# Patient Record
Sex: FEMALE | Race: WHITE | NOT HISPANIC OR LATINO | Employment: UNEMPLOYED | URBAN - METROPOLITAN AREA
[De-identification: names, ages, dates, MRNs, and addresses within clinical notes are randomized per-mention and may not be internally consistent; named-entity substitution may affect disease eponyms.]

---

## 2024-01-29 DIAGNOSIS — F41.9 ANXIETY: Primary | ICD-10-CM

## 2024-01-30 RX ORDER — MUPIROCIN 20 MG/G
OINTMENT TOPICAL
COMMUNITY
Start: 2023-02-27

## 2024-01-30 RX ORDER — MULTIVIT-MIN/IRON/FOLIC ACID/K 18-600-40
CAPSULE ORAL
COMMUNITY

## 2024-01-30 RX ORDER — BIMATOPROST 0.1 MG/ML
1 SOLUTION/ DROPS OPHTHALMIC
COMMUNITY

## 2024-01-30 RX ORDER — TRAMADOL HYDROCHLORIDE 50 MG/1
50 TABLET ORAL 2 TIMES DAILY PRN
COMMUNITY

## 2024-01-30 RX ORDER — HYDROCHLOROTHIAZIDE 25 MG/1
1 TABLET ORAL DAILY
COMMUNITY
Start: 2015-09-09

## 2024-01-30 RX ORDER — ASPIRIN 81 MG/1
TABLET ORAL EVERY 24 HOURS
COMMUNITY

## 2024-01-30 RX ORDER — LANOLIN ALCOHOL/MO/W.PET/CERES
1 CREAM (GRAM) TOPICAL DAILY
COMMUNITY

## 2024-01-30 RX ORDER — ESTROGENS, CONJUGATED 0.9 MG/1
TABLET, FILM COATED ORAL EVERY 24 HOURS
COMMUNITY
Start: 2008-07-23

## 2024-01-30 RX ORDER — SERTRALINE HYDROCHLORIDE 100 MG/1
100 TABLET, FILM COATED ORAL DAILY
Qty: 90 TABLET | Refills: 2 | Status: SHIPPED | OUTPATIENT
Start: 2024-01-30

## 2024-01-30 RX ORDER — ACETAMINOPHEN 500 MG
2000 TABLET ORAL DAILY
COMMUNITY

## 2024-01-30 RX ORDER — SERTRALINE HYDROCHLORIDE 100 MG/1
100 TABLET, FILM COATED ORAL DAILY
COMMUNITY
End: 2024-01-30 | Stop reason: SDUPTHER

## 2024-01-30 RX ORDER — BRIMONIDINE TARTRATE AND TIMOLOL MALEATE 2; 5 MG/ML; MG/ML
SOLUTION OPHTHALMIC
COMMUNITY

## 2024-04-10 PROBLEM — Z96.651 STATUS POST TOTAL RIGHT KNEE REPLACEMENT: Status: ACTIVE | Noted: 2024-04-10

## 2024-04-10 PROBLEM — R60.9 EDEMA: Status: ACTIVE | Noted: 2024-04-10

## 2024-04-10 PROBLEM — R32 URINARY INCONTINENCE: Status: ACTIVE | Noted: 2024-04-10

## 2024-04-10 PROBLEM — L03.119 CELLULITIS OF LOWER LEG: Status: ACTIVE | Noted: 2024-04-10

## 2024-04-10 PROBLEM — M46.1 INFLAMMATION OF SACROILIAC JOINT (CMS-HCC): Status: RESOLVED | Noted: 2024-04-10 | Resolved: 2024-04-10

## 2024-04-10 PROBLEM — M25.559 HIP PAIN: Status: ACTIVE | Noted: 2024-04-10

## 2024-04-10 PROBLEM — R26.9 ABNORMAL GAIT: Status: ACTIVE | Noted: 2024-04-10

## 2024-04-10 PROBLEM — S39.012A BACK STRAIN: Status: RESOLVED | Noted: 2024-04-10 | Resolved: 2024-04-10

## 2024-04-10 PROBLEM — M19.90 OSTEOARTHRITIS: Status: ACTIVE | Noted: 2024-04-10

## 2024-04-10 PROBLEM — F32.A DEPRESSION: Status: ACTIVE | Noted: 2024-04-10
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
## 2024-04-10 PROBLEM — M48.062 LUMBAR STENOSIS WITH NEUROGENIC CLAUDICATION: Status: ACTIVE | Noted: 2024-04-10

## 2024-04-10 PROBLEM — M25.539 PAIN IN WRIST: Status: ACTIVE | Noted: 2024-04-10

## 2024-04-10 PROBLEM — Z91.89 AT RISK FOR BLEEDING: Status: ACTIVE | Noted: 2024-04-10

## 2024-04-10 PROBLEM — I87.2 PERIPHERAL VENOUS INSUFFICIENCY: Status: ACTIVE | Noted: 2024-04-10

## 2024-04-10 PROBLEM — D64.9 ANEMIA: Status: ACTIVE | Noted: 2024-04-10

## 2024-04-10 PROBLEM — M25.579 ANKLE PAIN: Status: ACTIVE | Noted: 2024-04-10

## 2024-04-10 PROBLEM — E78.00 HYPERCHOLESTEROLEMIA: Status: ACTIVE | Noted: 2024-04-10

## 2024-04-10 PROBLEM — N95.9 UNSPECIFIED MENOPAUSAL AND PERIMENOPAUSAL DISORDER: Status: ACTIVE | Noted: 2024-04-10

## 2024-04-10 PROBLEM — M96.1 LUMBAR POST-LAMINECTOMY SYNDROME: Status: ACTIVE | Noted: 2024-04-10

## 2024-04-10 PROBLEM — M85.80 OSTEOPENIA: Status: ACTIVE | Noted: 2024-04-10

## 2024-04-10 PROBLEM — M79.673 FOOT PAIN: Status: ACTIVE | Noted: 2024-04-10

## 2024-04-10 PROBLEM — M54.50 LUMBOSACRAL PAIN: Status: ACTIVE | Noted: 2024-04-10

## 2024-04-10 PROBLEM — G47.33 OBSTRUCTIVE SLEEP APNEA SYNDROME: Status: ACTIVE | Noted: 2024-04-10

## 2024-04-10 PROBLEM — M25.569 KNEE PAIN: Status: ACTIVE | Noted: 2024-04-10

## 2024-04-10 PROBLEM — S69.90XA INJURY OF WRIST: Status: RESOLVED | Noted: 2024-04-10 | Resolved: 2024-04-10

## 2024-04-10 PROBLEM — M17.12 PRIMARY OSTEOARTHRITIS OF LEFT KNEE: Status: ACTIVE | Noted: 2024-04-10

## 2024-04-10 PROBLEM — H80.90 OTOSCLEROSIS: Status: ACTIVE | Noted: 2024-04-10

## 2024-04-10 PROBLEM — M54.2 NECK PAIN: Status: ACTIVE | Noted: 2024-04-10

## 2024-04-10 PROBLEM — G47.10 HYPERSOMNIA: Status: ACTIVE | Noted: 2024-04-10

## 2024-04-10 PROBLEM — G89.29 OTHER CHRONIC PAIN: Status: ACTIVE | Noted: 2024-04-10

## 2024-04-10 PROBLEM — E55.9 VITAMIN D DEFICIENCY: Status: ACTIVE | Noted: 2024-04-10

## 2024-06-06 ENCOUNTER — APPOINTMENT (OUTPATIENT)
Dept: PRIMARY CARE | Facility: CLINIC | Age: 89
End: 2024-06-06
Payer: MEDICARE

## 2024-11-07 ENCOUNTER — TELEPHONE (OUTPATIENT)
Dept: PRIMARY CARE | Facility: CLINIC | Age: 89
End: 2024-11-07
Payer: MEDICARE

## 2024-11-07 DIAGNOSIS — F41.9 ANXIETY: ICD-10-CM

## 2024-11-07 RX ORDER — SERTRALINE HYDROCHLORIDE 100 MG/1
100 TABLET, FILM COATED ORAL DAILY
Qty: 90 TABLET | Refills: 2 | Status: SHIPPED | OUTPATIENT
Start: 2024-11-07

## 2024-11-07 NOTE — TELEPHONE ENCOUNTER
Left  @ [facility] to cancel prescription Maira sent electronically but did not mean to send as pt has not been seen in the office since 7/19/22.

## 2025-07-18 ENCOUNTER — OFFICE VISIT (OUTPATIENT)
Dept: PRIMARY CARE | Facility: CLINIC | Age: OVER 89
End: 2025-07-18
Payer: MEDICARE

## 2025-07-18 VITALS
WEIGHT: 156 LBS | HEART RATE: 77 BPM | HEIGHT: 62 IN | SYSTOLIC BLOOD PRESSURE: 140 MMHG | DIASTOLIC BLOOD PRESSURE: 78 MMHG | BODY MASS INDEX: 28.71 KG/M2 | OXYGEN SATURATION: 97 % | TEMPERATURE: 97.3 F

## 2025-07-18 DIAGNOSIS — R60.0 BILATERAL LOWER EXTREMITY EDEMA: ICD-10-CM

## 2025-07-18 DIAGNOSIS — E78.2 MIXED HYPERLIPIDEMIA: ICD-10-CM

## 2025-07-18 DIAGNOSIS — F41.9 ANXIETY: ICD-10-CM

## 2025-07-18 DIAGNOSIS — Z86.2 HISTORY OF ANEMIA: ICD-10-CM

## 2025-07-18 DIAGNOSIS — F41.8 DEPRESSION WITH ANXIETY: ICD-10-CM

## 2025-07-18 DIAGNOSIS — E55.9 VITAMIN D DEFICIENCY: ICD-10-CM

## 2025-07-18 DIAGNOSIS — Z76.89 ENCOUNTER TO ESTABLISH CARE WITH NEW DOCTOR: Primary | ICD-10-CM

## 2025-07-18 DIAGNOSIS — R73.9 HYPERGLYCEMIA: ICD-10-CM

## 2025-07-18 PROCEDURE — 1159F MED LIST DOCD IN RCRD: CPT | Performed by: INTERNAL MEDICINE

## 2025-07-18 PROCEDURE — 99214 OFFICE O/P EST MOD 30 MIN: CPT | Performed by: INTERNAL MEDICINE

## 2025-07-18 PROCEDURE — 1126F AMNT PAIN NOTED NONE PRSNT: CPT | Performed by: INTERNAL MEDICINE

## 2025-07-18 PROCEDURE — G2211 COMPLEX E/M VISIT ADD ON: HCPCS | Performed by: INTERNAL MEDICINE

## 2025-07-18 PROCEDURE — 1157F ADVNC CARE PLAN IN RCRD: CPT | Performed by: INTERNAL MEDICINE

## 2025-07-18 RX ORDER — SERTRALINE HYDROCHLORIDE 100 MG/1
100 TABLET, FILM COATED ORAL DAILY
Qty: 90 TABLET | Refills: 0 | Status: SHIPPED | OUTPATIENT
Start: 2025-07-18

## 2025-07-18 RX ORDER — BUSPIRONE HYDROCHLORIDE 5 MG/1
5 TABLET ORAL 2 TIMES DAILY
Qty: 60 TABLET | Refills: 1 | Status: SHIPPED | OUTPATIENT
Start: 2025-07-18 | End: 2026-07-18

## 2025-07-18 RX ORDER — FUROSEMIDE 20 MG/1
20 TABLET ORAL DAILY
Qty: 30 TABLET | Refills: 1 | Status: SHIPPED | OUTPATIENT
Start: 2025-07-18 | End: 2026-07-18

## 2025-07-18 RX ORDER — TRAMADOL HYDROCHLORIDE 50 MG/1
50 TABLET, FILM COATED ORAL DAILY
Qty: 45 TABLET | Refills: 1 | Status: SHIPPED | OUTPATIENT
Start: 2025-07-18

## 2025-07-18 ASSESSMENT — PATIENT HEALTH QUESTIONNAIRE - PHQ9
2. FEELING DOWN, DEPRESSED OR HOPELESS: NOT AT ALL
SUM OF ALL RESPONSES TO PHQ9 QUESTIONS 1 AND 2: 0
1. LITTLE INTEREST OR PLEASURE IN DOING THINGS: NOT AT ALL

## 2025-07-18 ASSESSMENT — ENCOUNTER SYMPTOMS
OCCASIONAL FEELINGS OF UNSTEADINESS: 1
LOSS OF SENSATION IN FEET: 0
DEPRESSION: 0

## 2025-07-18 ASSESSMENT — PAIN SCALES - GENERAL: PAINLEVEL_OUTOF10: 0-NO PAIN

## 2025-07-18 NOTE — PROGRESS NOTES
[facility]: [facility]  PROGRESS NOTE      Yue Barnard is a 89 y.o. female that is presenting today for New Patient Visit (Establish as a new patient ).    Assessment/Plan   {Assess/Plan SmartLinks (Optional):59026}  Subjective   HPI      - Yue Barnard 89 y.o. female is here today ***         - Patient denies any *** symptoms or concerns at this time.       - patient denies any adverse reactions to or concerns with his/her meds.       - Problem list and medication reconciliation done today.  - V.S. Stable. No changes at this time.  - Encouraged continued diet and exercise modification.     Review of Systems   All pertinent POSITIVES as noted per HPI.  All other systems have been reviewed and are NEGATIVE and /or Noncontributory to this patient current visit or complaint.     Objective   Vitals:    07/18/25 1322   BP: 140/78   Pulse: 77   Temp: 36.3 °C (97.3 °F)   SpO2: 97%      Body mass index is 28.53 kg/m².  Physical Exam  Diagnostic Results   Lab Results   Component Value Date    GLUCOSE 90 07/03/2025    CALCIUM 8.9 07/03/2025     07/03/2025    K 4.2 07/03/2025    CO2 24 07/03/2025     07/03/2025    BUN 23 07/03/2025    CREATININE 0.86 07/03/2025     Lab Results   Component Value Date    ALT 12 07/21/2022    AST 18 07/21/2022    ALKPHOS 69 07/21/2022    BILITOT 0.2 07/21/2022     Lab Results   Component Value Date    WBC 9.7 07/03/2025    HGB 9.0 (L) 07/03/2025    HCT 29.4 (L) 07/03/2025    MCV 94 07/03/2025     07/03/2025     Lab Results   Component Value Date    CHOL 277 (H) 07/21/2022    CHOL 235 (H) 07/15/2021    CHOL 222 (H) 08/27/2020     Lab Results   Component Value Date    HDL 65 07/21/2022    HDL 59 07/15/2021    HDL 65 08/27/2020     Lab Results   Component Value Date    LDLCALC 173 (H) 07/21/2022    LDLCALC 144 (H) 07/15/2021    LDLCALC 130 08/27/2020     Lab Results   Component Value Date    TRIG 194 (H) 07/21/2022    TRIG 161 (H) 07/15/2021    TRIG  "133 08/27/2020     No components found for: \"CHOLHDL\"  Lab Results   Component Value Date    HGBA1C 6.1 (H) 12/02/2024     Other labs not included in the list above were reviewed either before or during this encounter.    History    Medical History[1]  Surgical History[2]  Family History[3]  Social History     Socioeconomic History    Marital status:      Spouse name: Not on file    Number of children: Not on file    Years of education: Not on file    Highest education level: Not on file   Occupational History    Not on file   Tobacco Use    Smoking status: Never     Passive exposure: Never    Smokeless tobacco: Never   Vaping Use    Vaping status: Never Used   Substance and Sexual Activity    Alcohol use: Never    Drug use: Never    Sexual activity: Not on file   Other Topics Concern    Not on file   Social History Narrative    Not on file     Social Drivers of Health     Financial Resource Strain: Not on file   Food Insecurity: No Food Insecurity (6/23/2025)    Received from [facility]    Hunger Vital Sign     Within the past 12 months, you worried that your food would run out before you got the money to buy more.: Never true     Within the past 12 months, the food you bought just didn't last and you didn't have money to get more.: Never true   Transportation Needs: No Transportation Needs (6/23/2025)    Received from [facility]    PRAPARE - Transportation     Lack of Transportation (Medical): No     Lack of Transportation (Non-Medical): No   Physical Activity: Not on file   Stress: Not on file   Social Connections: Not on file   Intimate Partner Violence: Not At Risk (7/1/2024)    Received from [facility].    Humiliation, Afraid, Rape, and Kick questionnaire     Within the last year, have you been afraid of your partner or ex-partner?: No     Within the last year, have you been humiliated or emotionally abused in other ways by your partner or ex-partner?: No     Within the last " year, have you been kicked, hit, slapped, or otherwise physically hurt by your partner or ex-partner?: No     Within the last year, have you been raped or forced to have any kind of sexual activity by your partner or ex-partner?: No   Housing Stability: Low Risk  (6/23/2025)    Received from [facility]    Housing Stability Vital Sign     In the last 12 months, was there a time when you were not able to pay the mortgage or rent on time?: No     In the past 12 months, how many times have you moved where you were living?: 0     At any time in the past 12 months, were you homeless or living in a shelter (including now)?: No     Allergies[4]  Medications Ordered Prior to Encounter[5]  Immunization History   Administered Date(s) Administered    Flu vaccine, trivalent, preservative free, HIGH-DOSE, age 65y+ (Fluzone) 09/20/2018    Influenza, seasonal, injectable 10/23/2013    Moderna SARS-CoV-2 Vaccination 03/09/2021, 04/06/2021, 12/22/2021    Pfizer COVID-19 vaccine, 12 years and older, (30mcg/0.3mL) (Comirnaty) 09/08/2024    Pneumococcal conjugate vaccine, 13-valent (PREVNAR 13) 07/05/2016    Pneumococcal polysaccharide vaccine, 23-valent, age 2 years and older (PNEUMOVAX 23) 06/23/2010    Tdap vaccine, age 7 year and older (BOOSTRIX, ADACEL) 10/02/2018, 06/15/2023    Tetanus toxoid, adsorbed 12/27/2007    Zoster, live 10/23/2012     Patient's medical history was reviewed and updated either before or during this encounter.       Petar Darby MD       [1]   Past Medical History:  Diagnosis Date    Back strain 04/10/2024    Cardiac pacemaker 2025    Inflammation of sacroiliac joint 04/10/2024    Left groin pain 07/31/2014    Left thigh pain 07/31/2014   [2] History reviewed. No pertinent surgical history.  [3] No family history on file.  [4] No Known Allergies  [5]   Current Outpatient Medications on File Prior to Visit   Medication Sig Dispense Refill    ascorbic acid, vitamin C, 500 mg capsule Take by mouth.       aspirin 81 mg EC tablet once every 24 hours.      cholecalciferol (Vitamin D-3) 50 mcg (2,000 unit) capsule Take 1 capsule (2,000 Units) by mouth early in the morning..      cyanocobalamin (Vitamin B-12) 1,000 mcg tablet Take 1 tablet (1,000 mcg) by mouth once daily.      hydroCHLOROthiazide (HYDRODiuril) 25 mg tablet Take 1 tablet (25 mg) by mouth once daily.      Lumigan 0.01 % ophthalmic solution Administer 1 drop into both eyes.      multivit-min/ferrous fumarate (MULTI VITAMIN ORAL) Take by mouth once every 24 hours.      polyethylene glycol 3350 (MIRALAX ORAL) as directed Orally      sertraline (Zoloft) 100 mg tablet TAKE 1 TABLET(100 MG) BY MOUTH EVERY DAY 90 tablet 2    traMADol (Ultram) 50 mg tablet Take 1 tablet (50 mg) by mouth 2 times a day as needed.      brimonidine-timoloL (Combigan) 0.2-0.5 % ophthalmic solution INSTILL 1 DROP IN LEFT EYE TWICE DAILY (Patient not taking: Reported on 7/18/2025)      UNABLE TO FIND as directed      [DISCONTINUED] mupirocin (Bactroban) 2 % ointment APPLY TOPICALLY TO THE AFFECTED AREA EVERY DAY      [DISCONTINUED] Premarin 0.9 mg tablet once every 24 hours.       No current facility-administered medications on file prior to visit.      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 No pertinent surgical history.  [3] No family history on file.  [4] No Known Allergies  [5]   Current Outpatient Medications on File Prior to Visit   Medication Sig Dispense Refill    ascorbic acid, vitamin C, 500 mg capsule Take by mouth.      aspirin 81 mg EC tablet once every 24 hours.      cholecalciferol (Vitamin D-3) 50 mcg (2,000 unit) capsule Take 1 capsule (2,000 Units) by mouth early in the morning..      cyanocobalamin (Vitamin B-12) 1,000 mcg tablet Take 1 tablet (1,000 mcg) by mouth once daily.      hydroCHLOROthiazide (HYDRODiuril) 25 mg tablet Take 1 tablet (25 mg) by mouth once daily.      Lumigan 0.01 % ophthalmic solution Administer 1 drop into both eyes.      multivit-min/ferrous fumarate (MULTI VITAMIN ORAL) Take by mouth once every 24 hours.      polyethylene glycol 3350 (MIRALAX ORAL) as directed Orally      sertraline (Zoloft) 100 mg tablet TAKE 1 TABLET(100 MG) BY MOUTH EVERY DAY 90 tablet 2    traMADol (Ultram) 50 mg tablet Take 1 tablet (50 mg) by mouth 2 times a day as needed.      brimonidine-timoloL (Combigan) 0.2-0.5 % ophthalmic solution INSTILL 1 DROP IN LEFT EYE TWICE DAILY (Patient not taking: Reported on 7/18/2025)      UNABLE TO FIND as directed      [DISCONTINUED] mupirocin (Bactroban) 2 % ointment APPLY TOPICALLY TO THE AFFECTED AREA EVERY DAY      [DISCONTINUED] Premarin 0.9 mg tablet once every 24 hours.       No current facility-administered medications on file prior to visit.

## 2025-09-08 ENCOUNTER — APPOINTMENT (OUTPATIENT)
Dept: PRIMARY CARE | Facility: CLINIC | Age: OVER 89
End: 2025-09-08
Payer: MEDICARE

## 2025-09-24 ENCOUNTER — APPOINTMENT (OUTPATIENT)
Dept: GERIATRIC MEDICINE | Facility: CLINIC | Age: OVER 89
End: 2025-09-24
Payer: MEDICARE